# Patient Record
Sex: FEMALE | Race: WHITE | NOT HISPANIC OR LATINO | Employment: OTHER | URBAN - METROPOLITAN AREA
[De-identification: names, ages, dates, MRNs, and addresses within clinical notes are randomized per-mention and may not be internally consistent; named-entity substitution may affect disease eponyms.]

---

## 2024-08-04 ENCOUNTER — APPOINTMENT (OUTPATIENT)
Dept: CT IMAGING | Facility: HOSPITAL | Age: 64
End: 2024-08-04
Payer: MEDICARE

## 2024-08-04 ENCOUNTER — HOSPITAL ENCOUNTER (OUTPATIENT)
Facility: HOSPITAL | Age: 64
Setting detail: OBSERVATION
Discharge: HOME | End: 2024-08-05
Attending: EMERGENCY MEDICINE | Admitting: EMERGENCY MEDICINE
Payer: MEDICARE

## 2024-08-04 DIAGNOSIS — R55 NEAR SYNCOPE: Primary | ICD-10-CM

## 2024-08-04 DIAGNOSIS — E86.0 DEHYDRATION: ICD-10-CM

## 2024-08-04 DIAGNOSIS — D32.9 MENINGIOMA: ICD-10-CM

## 2024-08-04 DIAGNOSIS — G20.A1 PARKINSON'S DISEASE, UNSPECIFIED WHETHER DYSKINESIA PRESENT, UNSPECIFIED WHETHER MANIFESTATIONS FLUCTUATE: ICD-10-CM

## 2024-08-04 DIAGNOSIS — N20.1 CALCULUS OF URETER: ICD-10-CM

## 2024-08-04 LAB
ALBUMIN SERPL-MCNC: 4.5 G/DL (ref 3.5–5.2)
ALBUMIN/GLOB SERPL: 1.9 G/DL
ALP SERPL-CCNC: 83 U/L (ref 39–117)
ALT SERPL W P-5'-P-CCNC: <5 U/L (ref 1–33)
AMMONIA BLD-SCNC: 30 UMOL/L (ref 11–51)
ANION GAP SERPL CALCULATED.3IONS-SCNC: 10.1 MMOL/L (ref 5–15)
AST SERPL-CCNC: 18 U/L (ref 1–32)
BACTERIA UR QL AUTO: ABNORMAL /HPF
BASOPHILS # BLD AUTO: 0.02 10*3/MM3 (ref 0–0.2)
BASOPHILS NFR BLD AUTO: 0.3 % (ref 0–1.5)
BILIRUB SERPL-MCNC: 0.6 MG/DL (ref 0–1.2)
BILIRUB UR QL STRIP: NEGATIVE
BUN SERPL-MCNC: 20 MG/DL (ref 8–23)
BUN/CREAT SERPL: 29.9 (ref 7–25)
CALCIUM SPEC-SCNC: 9.5 MG/DL (ref 8.6–10.5)
CHLORIDE SERPL-SCNC: 106 MMOL/L (ref 98–107)
CLARITY UR: ABNORMAL
CO2 SERPL-SCNC: 23.9 MMOL/L (ref 22–29)
COLOR UR: YELLOW
CREAT SERPL-MCNC: 0.67 MG/DL (ref 0.57–1)
DEPRECATED RDW RBC AUTO: 44.2 FL (ref 37–54)
EGFRCR SERPLBLD CKD-EPI 2021: 97.1 ML/MIN/1.73
EOSINOPHIL # BLD AUTO: 0.08 10*3/MM3 (ref 0–0.4)
EOSINOPHIL NFR BLD AUTO: 1.2 % (ref 0.3–6.2)
ERYTHROCYTE [DISTWIDTH] IN BLOOD BY AUTOMATED COUNT: 13.1 % (ref 12.3–15.4)
GLOBULIN UR ELPH-MCNC: 2.4 GM/DL
GLUCOSE SERPL-MCNC: 118 MG/DL (ref 65–99)
GLUCOSE UR STRIP-MCNC: NEGATIVE MG/DL
HCT VFR BLD AUTO: 39.7 % (ref 34–46.6)
HGB BLD-MCNC: 12.5 G/DL (ref 12–15.9)
HGB UR QL STRIP.AUTO: NEGATIVE
HYALINE CASTS UR QL AUTO: ABNORMAL /LPF
IMM GRANULOCYTES # BLD AUTO: 0.02 10*3/MM3 (ref 0–0.05)
IMM GRANULOCYTES NFR BLD AUTO: 0.3 % (ref 0–0.5)
KETONES UR QL STRIP: ABNORMAL
LEUKOCYTE ESTERASE UR QL STRIP.AUTO: ABNORMAL
LIPASE SERPL-CCNC: 21 U/L (ref 13–60)
LYMPHOCYTES # BLD AUTO: 1.33 10*3/MM3 (ref 0.7–3.1)
LYMPHOCYTES NFR BLD AUTO: 19.2 % (ref 19.6–45.3)
MAGNESIUM SERPL-MCNC: 2.1 MG/DL (ref 1.6–2.4)
MCH RBC QN AUTO: 29 PG (ref 26.6–33)
MCHC RBC AUTO-ENTMCNC: 31.5 G/DL (ref 31.5–35.7)
MCV RBC AUTO: 92.1 FL (ref 79–97)
MONOCYTES # BLD AUTO: 0.48 10*3/MM3 (ref 0.1–0.9)
MONOCYTES NFR BLD AUTO: 6.9 % (ref 5–12)
NEUTROPHILS NFR BLD AUTO: 4.98 10*3/MM3 (ref 1.7–7)
NEUTROPHILS NFR BLD AUTO: 72.1 % (ref 42.7–76)
NITRITE UR QL STRIP: NEGATIVE
NRBC BLD AUTO-RTO: 0 /100 WBC (ref 0–0.2)
NT-PROBNP SERPL-MCNC: 51.8 PG/ML (ref 0–900)
PH UR STRIP.AUTO: 7 [PH] (ref 5–8)
PLATELET # BLD AUTO: 162 10*3/MM3 (ref 140–450)
PMV BLD AUTO: 11.7 FL (ref 6–12)
POTASSIUM SERPL-SCNC: 4 MMOL/L (ref 3.5–5.2)
PROT SERPL-MCNC: 6.9 G/DL (ref 6–8.5)
PROT UR QL STRIP: NEGATIVE
RBC # BLD AUTO: 4.31 10*6/MM3 (ref 3.77–5.28)
RBC # UR STRIP: ABNORMAL /HPF
REF LAB TEST METHOD: ABNORMAL
SODIUM SERPL-SCNC: 140 MMOL/L (ref 136–145)
SP GR UR STRIP: 1.02 (ref 1–1.03)
SQUAMOUS #/AREA URNS HPF: ABNORMAL /HPF
TROPONIN T SERPL HS-MCNC: 10 NG/L
TSH SERPL DL<=0.05 MIU/L-ACNC: 0.97 UIU/ML (ref 0.27–4.2)
UROBILINOGEN UR QL STRIP: ABNORMAL
WBC # UR STRIP: ABNORMAL /HPF
WBC NRBC COR # BLD AUTO: 6.91 10*3/MM3 (ref 3.4–10.8)

## 2024-08-04 PROCEDURE — 25010000002 ONDANSETRON PER 1 MG: Performed by: EMERGENCY MEDICINE

## 2024-08-04 PROCEDURE — 70450 CT HEAD/BRAIN W/O DYE: CPT

## 2024-08-04 PROCEDURE — 74176 CT ABD & PELVIS W/O CONTRAST: CPT

## 2024-08-04 PROCEDURE — 99285 EMERGENCY DEPT VISIT HI MDM: CPT

## 2024-08-04 PROCEDURE — 93005 ELECTROCARDIOGRAM TRACING: CPT | Performed by: EMERGENCY MEDICINE

## 2024-08-04 PROCEDURE — 80053 COMPREHEN METABOLIC PANEL: CPT | Performed by: EMERGENCY MEDICINE

## 2024-08-04 PROCEDURE — G0378 HOSPITAL OBSERVATION PER HR: HCPCS

## 2024-08-04 PROCEDURE — 81001 URINALYSIS AUTO W/SCOPE: CPT | Performed by: EMERGENCY MEDICINE

## 2024-08-04 PROCEDURE — 84484 ASSAY OF TROPONIN QUANT: CPT | Performed by: EMERGENCY MEDICINE

## 2024-08-04 PROCEDURE — 84443 ASSAY THYROID STIM HORMONE: CPT | Performed by: EMERGENCY MEDICINE

## 2024-08-04 PROCEDURE — 83880 ASSAY OF NATRIURETIC PEPTIDE: CPT | Performed by: NURSE PRACTITIONER

## 2024-08-04 PROCEDURE — 96374 THER/PROPH/DIAG INJ IV PUSH: CPT

## 2024-08-04 PROCEDURE — 85025 COMPLETE CBC W/AUTO DIFF WBC: CPT | Performed by: EMERGENCY MEDICINE

## 2024-08-04 PROCEDURE — 83690 ASSAY OF LIPASE: CPT | Performed by: EMERGENCY MEDICINE

## 2024-08-04 PROCEDURE — 82140 ASSAY OF AMMONIA: CPT | Performed by: EMERGENCY MEDICINE

## 2024-08-04 PROCEDURE — 83735 ASSAY OF MAGNESIUM: CPT | Performed by: EMERGENCY MEDICINE

## 2024-08-04 PROCEDURE — 93005 ELECTROCARDIOGRAM TRACING: CPT

## 2024-08-04 PROCEDURE — 25810000003 SODIUM CHLORIDE 0.9 % SOLUTION: Performed by: EMERGENCY MEDICINE

## 2024-08-04 RX ORDER — LEVODOPA AND CARBIDOPA 245; 61.25 MG/1; MG/1
1 CAPSULE, EXTENDED RELEASE ORAL 3 TIMES DAILY
COMMUNITY

## 2024-08-04 RX ORDER — SODIUM CHLORIDE 9 MG/ML
40 INJECTION, SOLUTION INTRAVENOUS AS NEEDED
Status: DISCONTINUED | OUTPATIENT
Start: 2024-08-04 | End: 2024-08-05 | Stop reason: HOSPADM

## 2024-08-04 RX ORDER — PRAMIPEXOLE DIHYDROCHLORIDE 1.5 MG/1
1.5 TABLET ORAL 3 TIMES DAILY
COMMUNITY

## 2024-08-04 RX ORDER — HYDROXYZINE PAMOATE 25 MG/1
25 CAPSULE ORAL 3 TIMES DAILY PRN
COMMUNITY
End: 2024-08-04

## 2024-08-04 RX ORDER — BISACODYL 10 MG
10 SUPPOSITORY, RECTAL RECTAL DAILY PRN
Status: DISCONTINUED | OUTPATIENT
Start: 2024-08-04 | End: 2024-08-05 | Stop reason: HOSPADM

## 2024-08-04 RX ORDER — SODIUM CHLORIDE 0.9 % (FLUSH) 0.9 %
10 SYRINGE (ML) INJECTION EVERY 12 HOURS SCHEDULED
Status: DISCONTINUED | OUTPATIENT
Start: 2024-08-04 | End: 2024-08-05 | Stop reason: HOSPADM

## 2024-08-04 RX ORDER — SELEGILINE HYDROCHLORIDE 5 MG/1
5 CAPSULE ORAL
Status: DISCONTINUED | OUTPATIENT
Start: 2024-08-04 | End: 2024-08-05 | Stop reason: HOSPADM

## 2024-08-04 RX ORDER — POLYETHYLENE GLYCOL 3350 17 G/17G
17 POWDER, FOR SOLUTION ORAL DAILY PRN
Status: DISCONTINUED | OUTPATIENT
Start: 2024-08-04 | End: 2024-08-05 | Stop reason: HOSPADM

## 2024-08-04 RX ORDER — CARBIDOPA AND LEVODOPA 25; 100 MG/1; MG/1
2 TABLET, EXTENDED RELEASE ORAL 3 TIMES DAILY
Status: DISCONTINUED | OUTPATIENT
Start: 2024-08-04 | End: 2024-08-05 | Stop reason: HOSPADM

## 2024-08-04 RX ORDER — NITROGLYCERIN 0.4 MG/1
0.4 TABLET SUBLINGUAL
Status: DISCONTINUED | OUTPATIENT
Start: 2024-08-04 | End: 2024-08-05 | Stop reason: HOSPADM

## 2024-08-04 RX ORDER — AMOXICILLIN 250 MG
2 CAPSULE ORAL 2 TIMES DAILY PRN
Status: DISCONTINUED | OUTPATIENT
Start: 2024-08-04 | End: 2024-08-05 | Stop reason: HOSPADM

## 2024-08-04 RX ORDER — ALUMINA, MAGNESIA, AND SIMETHICONE 2400; 2400; 240 MG/30ML; MG/30ML; MG/30ML
15 SUSPENSION ORAL EVERY 6 HOURS PRN
Status: DISCONTINUED | OUTPATIENT
Start: 2024-08-04 | End: 2024-08-05 | Stop reason: HOSPADM

## 2024-08-04 RX ORDER — ONDANSETRON 4 MG/1
4 TABLET, ORALLY DISINTEGRATING ORAL EVERY 6 HOURS PRN
Status: DISCONTINUED | OUTPATIENT
Start: 2024-08-04 | End: 2024-08-05 | Stop reason: HOSPADM

## 2024-08-04 RX ORDER — ONDANSETRON 2 MG/ML
4 INJECTION INTRAMUSCULAR; INTRAVENOUS EVERY 6 HOURS PRN
Status: DISCONTINUED | OUTPATIENT
Start: 2024-08-04 | End: 2024-08-05 | Stop reason: HOSPADM

## 2024-08-04 RX ORDER — BISACODYL 5 MG/1
5 TABLET, DELAYED RELEASE ORAL DAILY PRN
Status: DISCONTINUED | OUTPATIENT
Start: 2024-08-04 | End: 2024-08-05 | Stop reason: HOSPADM

## 2024-08-04 RX ORDER — SELEGILINE HYDROCHLORIDE 5 MG/1
5 CAPSULE ORAL
COMMUNITY

## 2024-08-04 RX ORDER — SODIUM CHLORIDE 0.9 % (FLUSH) 0.9 %
10 SYRINGE (ML) INJECTION AS NEEDED
Status: DISCONTINUED | OUTPATIENT
Start: 2024-08-04 | End: 2024-08-05 | Stop reason: HOSPADM

## 2024-08-04 RX ORDER — ONDANSETRON 2 MG/ML
8 INJECTION INTRAMUSCULAR; INTRAVENOUS ONCE
Status: COMPLETED | OUTPATIENT
Start: 2024-08-04 | End: 2024-08-04

## 2024-08-04 RX ADMIN — PRAMIPEXOLE DIHYDROCHLORIDE 1.5 MG: 1 TABLET ORAL at 22:54

## 2024-08-04 RX ADMIN — PRAMIPEXOLE DIHYDROCHLORIDE 1.5 MG: 1 TABLET ORAL at 18:10

## 2024-08-04 RX ADMIN — CARBIDOPA AND LEVODOPA 2 TABLET: 25; 100 TABLET, EXTENDED RELEASE ORAL at 22:53

## 2024-08-04 RX ADMIN — CARBIDOPA AND LEVODOPA 2 TABLET: 25; 100 TABLET, EXTENDED RELEASE ORAL at 18:10

## 2024-08-04 RX ADMIN — Medication 10 ML: at 20:53

## 2024-08-04 RX ADMIN — SODIUM CHLORIDE 500 ML: 9 INJECTION, SOLUTION INTRAVENOUS at 13:34

## 2024-08-04 RX ADMIN — ONDANSETRON 8 MG: 2 INJECTION INTRAMUSCULAR; INTRAVENOUS at 13:36

## 2024-08-04 RX ADMIN — SELEGILINE HYDROCHLORIDE 5 MG: 5 CAPSULE ORAL at 18:10

## 2024-08-04 NOTE — PLAN OF CARE
Goal Outcome Evaluation:    New admit. Patient denies n/v, dizziness. Awaiting cardiology consult for further recommendations

## 2024-08-04 NOTE — ED PROVIDER NOTES
Subjective   History of Present Illness  64-year-old female was at [employer] when she had the sudden onset of abdominal discomfort, diaphoresis, and severe nausea.  The patient went to the bathroom and vomited.  The patient states she felt like she was about to pass out.  She states that the abdominal pain then got better and the patient was feeling weak and still somewhat nauseated.  She states she has not recently had headaches or focal neurologic defects or lateralizing neurologic signs.  The patient denies neck pain or stiffness she denies chest pain or exertional dyspnea she reports no recent weight changes.  The family has been concerned about the possibility of Parkinson syndrome as the patient's worsening tremor and other symptomatology      Review of Systems   Neurological:  Positive for tremors, weakness and light-headedness. Negative for facial asymmetry, speech difficulty and numbness.   Hematological:  Does not bruise/bleed easily.       No past medical history on file.  The patient told the nurse that she had been evaluated and there was concern for Parkinson syndrome.  The family came out independently to tell me of their parkinsonian concerns.  The patient has history of previous kidney stones medication review found the patient to be taking carbidopa and levodopa  No Known Allergies    No past surgical history on file.    No family history on file.    Social History     Socioeconomic History    Marital status:        Lives independently has traveled recently to [place] however the family reports this is not an unusual amount of activity for her    Objective   Physical Exam  Alert [place] Coma Scale 15   HEENT: Pupils equal and reactive to light. Conjunctivae are not injected. Normal tympanic membranes. Oropharynx and nares are normal.   Neck: Supple. Midline trachea. No JVD. No goiter.   Chest: Clear and equal breath sounds bilaterally, regular rate and rhythm without murmur or rub.   Abdomen:  Positive bowel sounds, nontender, nondistended. No rebound or peritoneal signs. No CVA tenderness.   Extremities/neuro right-hand-dominant cranial nerves intact there is a parkinsonian facies and what appears to be an essential tremor with range of motion there does appear to be some mild cogwheeling no clubbing. cyanosis or edema. Motor sensory exam is normal. The full range of motion is intact   Skin: Warm and dry, no rashes or petechia.   Lymphatic: No regional lymphadenopathy. No calf pain, swelling or Homans sign    Procedures           ED Course                                 Labs Reviewed   COMPREHENSIVE METABOLIC PANEL - Abnormal; Notable for the following components:       Result Value    Glucose 118 (*)     BUN/Creatinine Ratio 29.9 (*)     All other components within normal limits    Narrative:     GFR Normal >60  Chronic Kidney Disease <60  Kidney Failure <15     URINALYSIS W/ CULTURE IF INDICATED - Abnormal; Notable for the following components:    Appearance, UA Cloudy (*)     Ketones, UA Trace (*)     Leuk Esterase, UA Moderate (2+) (*)     All other components within normal limits    Narrative:     In absence of clinical symptoms, the presence of pyuria, bacteria, and/or nitrites on the urinalysis result does not correlate with infection.   CBC WITH AUTO DIFFERENTIAL - Abnormal; Notable for the following components:    Lymphocyte % 19.2 (*)     All other components within normal limits   URINALYSIS, MICROSCOPIC ONLY - Abnormal; Notable for the following components:    WBC, UA 3-5 (*)     All other components within normal limits   LIPASE - Normal   AMMONIA - Normal   MAGNESIUM - Normal   TSH - Normal   SINGLE HS TROPONIN T   CBC AND DIFFERENTIAL    Narrative:     The following orders were created for panel order CBC & Differential.  Procedure                               Abnormality         Status                     ---------                               -----------         ------                      CBC Auto Differential[[id number]]        Abnormal            Final result                 Please view results for these tests on the individual orders.     Medications   ondansetron (ZOFRAN) injection 8 mg (8 mg Intravenous Given 8/4/24 1336)   sodium chloride 0.9 % bolus 500 mL (0 mL Intravenous Stopped 8/4/24 1429)     CT Abdomen Pelvis Without Contrast    Result Date: 8/4/2024  Impression: 1.4 mm calcification adjacent to right ureterovesical junction, but without definite right hydronephrosis. This could either represent a pelvic phlebolith versus stone within distal right ureter. 2.Otherwise, no acute process identified on this exam. 3.Multiple calcifications within uterus, likely partially calcified fibroids. Electronically Signed: Que White MD  8/4/2024 1:06 PM EDT  Workstation ID: [id number]    CT Head Without Contrast    Result Date: 8/4/2024  Impression: 1.No acute intracranial process identified. 2.2 cm calcified extra-axial mass along right frontal convexity, most suggestive of meningioma. Electronically Signed: Que White MD  8/4/2024 12:59 PM EDT  Workstation ID: [id number]               Medical Decision Making  Meningioma was noted but there is no evidence of mass effect or edema.  There is no hemorrhage.  The patient will need follow-up evaluation.  The patient also was noted to have a small stone at the ureteropelvic junction.  The patient will be observed the patient had medication for pain control the patient was agreeable to this plan of treatment we will obtain serial troponin    Amount and/or Complexity of Data Reviewed  Labs: ordered.  Radiology: ordered.  ECG/medicine tests: ordered.    Risk  Prescription drug management.        Final diagnoses:   None       ED Disposition  ED Disposition       None            No follow-up provider specified.       Medication List      No changes were made to your prescriptions during this visit.            Gaurav Romero MD  08/04/24 6322

## 2024-08-04 NOTE — ED NOTES
Pt reports being at [employer] when she suddenly started feeling nauseous, tachycardic, and diaphoretic. Pt reports she vomited once with pink colored emesis- pt and pts family at bedside denies thinking it was blood at all. Pt reports still feeling really nauseous. Pts family at bedside thought she may have had a cardiac event or may be dehydrated. Pts family at bedside also reports pt seems delayed in her responses like she may be confused. Pt A&Ox4.

## 2024-08-05 ENCOUNTER — APPOINTMENT (OUTPATIENT)
Dept: RESPIRATORY THERAPY | Facility: HOSPITAL | Age: 64
End: 2024-08-05
Payer: MEDICARE

## 2024-08-05 VITALS
SYSTOLIC BLOOD PRESSURE: 112 MMHG | OXYGEN SATURATION: 96 % | HEIGHT: 67 IN | DIASTOLIC BLOOD PRESSURE: 67 MMHG | TEMPERATURE: 97.4 F | RESPIRATION RATE: 16 BRPM | HEART RATE: 57 BPM | WEIGHT: 212.08 LBS | BODY MASS INDEX: 33.29 KG/M2

## 2024-08-05 LAB
ANION GAP SERPL CALCULATED.3IONS-SCNC: 9.5 MMOL/L (ref 5–15)
BASOPHILS # BLD AUTO: 0.02 10*3/MM3 (ref 0–0.2)
BASOPHILS NFR BLD AUTO: 0.3 % (ref 0–1.5)
BUN SERPL-MCNC: 19 MG/DL (ref 8–23)
BUN/CREAT SERPL: 25.3 (ref 7–25)
CALCIUM SPEC-SCNC: 9.1 MG/DL (ref 8.6–10.5)
CHLORIDE SERPL-SCNC: 104 MMOL/L (ref 98–107)
CO2 SERPL-SCNC: 24.5 MMOL/L (ref 22–29)
CREAT SERPL-MCNC: 0.75 MG/DL (ref 0.57–1)
D DIMER PPP FEU-MCNC: 0.44 MG/L (FEU) (ref 0–0.65)
DEPRECATED RDW RBC AUTO: 44 FL (ref 37–54)
EGFRCR SERPLBLD CKD-EPI 2021: 88.5 ML/MIN/1.73
EOSINOPHIL # BLD AUTO: 0.19 10*3/MM3 (ref 0–0.4)
EOSINOPHIL NFR BLD AUTO: 3 % (ref 0.3–6.2)
ERYTHROCYTE [DISTWIDTH] IN BLOOD BY AUTOMATED COUNT: 13 % (ref 12.3–15.4)
GLUCOSE SERPL-MCNC: 103 MG/DL (ref 65–99)
HCT VFR BLD AUTO: 38.3 % (ref 34–46.6)
HGB BLD-MCNC: 12.2 G/DL (ref 12–15.9)
IMM GRANULOCYTES # BLD AUTO: 0.01 10*3/MM3 (ref 0–0.05)
IMM GRANULOCYTES NFR BLD AUTO: 0.2 % (ref 0–0.5)
LYMPHOCYTES # BLD AUTO: 1.96 10*3/MM3 (ref 0.7–3.1)
LYMPHOCYTES NFR BLD AUTO: 30.5 % (ref 19.6–45.3)
MCH RBC QN AUTO: 29.2 PG (ref 26.6–33)
MCHC RBC AUTO-ENTMCNC: 31.9 G/DL (ref 31.5–35.7)
MCV RBC AUTO: 91.6 FL (ref 79–97)
MONOCYTES # BLD AUTO: 0.49 10*3/MM3 (ref 0.1–0.9)
MONOCYTES NFR BLD AUTO: 7.6 % (ref 5–12)
NEUTROPHILS NFR BLD AUTO: 3.75 10*3/MM3 (ref 1.7–7)
NEUTROPHILS NFR BLD AUTO: 58.4 % (ref 42.7–76)
NRBC BLD AUTO-RTO: 0 /100 WBC (ref 0–0.2)
PLATELET # BLD AUTO: 168 10*3/MM3 (ref 140–450)
PMV BLD AUTO: 11.7 FL (ref 6–12)
POTASSIUM SERPL-SCNC: 4 MMOL/L (ref 3.5–5.2)
QT INTERVAL: 436 MS
QTC INTERVAL: 436 MS
RBC # BLD AUTO: 4.18 10*6/MM3 (ref 3.77–5.28)
SODIUM SERPL-SCNC: 138 MMOL/L (ref 136–145)
TROPONIN T SERPL HS-MCNC: 10 NG/L
WBC NRBC COR # BLD AUTO: 6.42 10*3/MM3 (ref 3.4–10.8)

## 2024-08-05 PROCEDURE — 80048 BASIC METABOLIC PNL TOTAL CA: CPT | Performed by: NURSE PRACTITIONER

## 2024-08-05 PROCEDURE — G0378 HOSPITAL OBSERVATION PER HR: HCPCS

## 2024-08-05 PROCEDURE — 99204 OFFICE O/P NEW MOD 45 MIN: CPT | Performed by: INTERNAL MEDICINE

## 2024-08-05 PROCEDURE — 85379 FIBRIN DEGRADATION QUANT: CPT | Performed by: NURSE PRACTITIONER

## 2024-08-05 PROCEDURE — 84484 ASSAY OF TROPONIN QUANT: CPT | Performed by: NURSE PRACTITIONER

## 2024-08-05 PROCEDURE — 85025 COMPLETE CBC W/AUTO DIFF WBC: CPT | Performed by: NURSE PRACTITIONER

## 2024-08-05 PROCEDURE — 93242 EXT ECG>48HR<7D RECORDING: CPT

## 2024-08-05 RX ADMIN — Medication 10 ML: at 08:16

## 2024-08-05 RX ADMIN — PRAMIPEXOLE DIHYDROCHLORIDE 1.5 MG: 1 TABLET ORAL at 08:15

## 2024-08-05 RX ADMIN — SELEGILINE HYDROCHLORIDE 5 MG: 5 CAPSULE ORAL at 08:16

## 2024-08-05 RX ADMIN — CARBIDOPA AND LEVODOPA 2 TABLET: 25; 100 TABLET, EXTENDED RELEASE ORAL at 08:16

## 2024-08-05 NOTE — PLAN OF CARE
Problem: Adult Inpatient Plan of Care  Goal: Absence of Hospital-Acquired Illness or Injury  Intervention: Identify and Manage Fall Risk  Recent Flowsheet Documentation  Taken 8/5/2024 0200 by Mayra Bird, RN  Safety Promotion/Fall Prevention: safety round/check completed  Taken 8/5/2024 0000 by Mayra Bird, RN  Safety Promotion/Fall Prevention: safety round/check completed   Goal Outcome Evaluation:         Pt had no complaints through the night. Pt does not currently have any consults or any further orders at this time. Care continues.

## 2024-08-05 NOTE — CASE MANAGEMENT/SOCIAL WORK
Case Management Discharge Note      Final Note: home with family         Selected Continued Care - Discharged on 8/5/2024 Admission date: 8/4/2024 - Discharge disposition: Home or Self Care   Transportation Services  Private: Car    Final Discharge Disposition Code: 01 - home or self-care

## 2024-08-05 NOTE — CONSULTS
Cardiology Consult Note    Patient Identification:  Name: Stefanie Burt  Age: 64 y.o.  Sex: female  :  1960  MRN: [id number]             Requesting Physician :  CASSIDY Melvin ED Observation     Reason for Consultation / Chief Complaint :   Near Syncope     History of Present Illness:      Mrs. Stefanie Burt has a PMH of     - Parkinson's disease   - otherwise no significant medical history   - parents with hypertension, but no CAD   - non smoker   - no allergies     Presented to the ED on 2024 with significant nausea.  Per patient she reports that she had significant nausea, and possibly some like abdominal cramping but no significant abdominal pain no vomiting she states that she was not dizzy or lightheaded.  She was at [employer] when this occurred she was able to walk to her car afterwards.  She denies any chest pain or shortness of breath.  Per her daughter who is a physical therapist here at the hospital reports that she was mildly confused clammy and diaphoretic therefore she brought her to the hospital.   Note that patient had just returned from a trip to [place].     In the ED labs were unremarkable including high-sensitivity troponin EKG showed sinus rhythm with no significant T wave abnormalities.  Orthostatic vital signs were negative  Again patient herself denies any lightheadedness or dizziness to suggest near syncope  Will check D-dimer    CT head unremarkable except for meningioma CT abdomen and pelvis did show possible kidney stone      Discussed with patient and family at bedside as well as nurse practitioner  Further recommendations per Dr. Lin        Electronically signed by CASSIDY Rock, 24, 12:20 PM EDT.    Cardiology attending addendum :    I have personally performed a face-to-face diagnostic evaluation, physical exam and reviewed data on this patient.  I have reviewed documentation done by me and nurse practitioner  and corrected as needed.  And agree  with the different components of documentation.Greater than 50% of the time spent in the care of this patient was provided by attending consultant/me.          Assessment:  :    Dizziness  Near syncope  Abdominal cramping and nausea  Parkinson's  Hyperglycemia    Recommendations / Plan:        EKG done 8/4/2024 reviewed/interpreted by me reveals sinus rhythm with rate of 63 bpm  Patient presented with dizziness and near syncope while in charge.  Symptoms appear vasovagal in etiology.  Will check echo and Holter monitor to evaluate symptoms.  Patient's  and patient wants to go home.  Discussed with admitting nurse practitioner Marilyn, will get echo as outpatient and do event monitor and follow-up in cardiology clinic after to monitor.  Discussed with patient and her  advised him to come back if symptoms are worse.  Follow-up with PMD for noncardiac symptoms and labs.           Diagnosis Plan   1. Near syncope        2. Calculus of ureter        3. Meningioma        4. Dehydration        5. Parkinson's disease, unspecified whether dyskinesia present, unspecified whether manifestations fluctuate                   Past Medical History:  History reviewed. No pertinent past medical history.  Past Surgical History:  History reviewed. No pertinent surgical history.   Allergies:  No Known Allergies  Home Meds:  Medications Prior to Admission   Medication Sig Dispense Refill Last Dose    Carbidopa-Levodopa ER (Rytary) 61. MG capsule controlled-release Take 1 capsule by mouth 3 (Three) Times a Day.   8/4/2024 at 9    pramipexole (MIRAPEX) 1.5 MG tablet Take 1 tablet by mouth 3 (Three) Times a Day.   8/4/2024 at 9    selegiline (ELDEPRYL) 5 MG capsule Take 1 capsule by mouth 2 (Two) Times a Day Before Meals.   8/4/2024 at 9     Current Meds:     Current Facility-Administered Medications:     aluminum-magnesium hydroxide-simethicone (MAALOX MAX) 400-400-40 MG/5ML suspension 15 mL, 15 mL, Oral, Q6H PRN,  Marilyn Hatfield APRN    sennosides-docusate (PERICOLACE) 8.6-50 MG per tablet 2 tablet, 2 tablet, Oral, BID PRN **AND** polyethylene glycol (MIRALAX) packet 17 g, 17 g, Oral, Daily PRN **AND** bisacodyl (DULCOLAX) EC tablet 5 mg, 5 mg, Oral, Daily PRN **AND** bisacodyl (DULCOLAX) suppository 10 mg, 10 mg, Rectal, Daily PRN, TripYassine kurtza S, APRN    carbidopa-levodopa ER (SINEMET CR)  MG per tablet 2 tablet, 2 tablet, Oral, TID, Marilyn Hatfield S, APRN, 2 tablet at 08/05/24 0816    nitroglycerin (NITROSTAT) SL tablet 0.4 mg, 0.4 mg, Sublingual, Q5 Min PRN, Marilyn Hatfield APRN    ondansetron ODT (ZOFRAN-ODT) disintegrating tablet 4 mg, 4 mg, Oral, Q6H PRN **OR** ondansetron (ZOFRAN) injection 4 mg, 4 mg, Intravenous, Q6H PRN, Yassine Hatfielda S, APRN    pramipexole (MIRAPEX) tablet 1.5 mg, 1.5 mg, Oral, TID, TripYassine kurtza S, APRN, 1.5 mg at 08/05/24 0815    selegiline (ELDEPRYL) capsule 5 mg, 5 mg, Oral, BID AC, TripYassine kurtza S, APRN, 5 mg at 08/05/24 0816    sodium chloride 0.9 % flush 10 mL, 10 mL, Intravenous, Q12H, Tripjerardo, Marilyn S, APRN, 10 mL at 08/05/24 0816    sodium chloride 0.9 % flush 10 mL, 10 mL, Intravenous, PRN, TripYassine kurtza S, APRN    sodium chloride 0.9 % infusion 40 mL, 40 mL, Intravenous, PRN, Tripure, Marilyn S, APRN  Social History:   Social History     Tobacco Use    Smoking status: Never    Smokeless tobacco: Never   Substance Use Topics    Alcohol use: Yes     Alcohol/week: 1.0 standard drink of alcohol     Types: 1 Glasses of wine per week      Family History:  History reviewed. No pertinent family history.     Review of Systems : Review of Systems   Cardiovascular:  Negative for chest pain, dyspnea on exertion, irregular heartbeat, near-syncope, palpitations and syncope.   Respiratory:  Negative for cough and shortness of breath.    Musculoskeletal:  Positive for back pain.   Gastrointestinal:  Positive for nausea. Negative for vomiting.  "  Neurological:  Negative for dizziness and light-headedness.   All other systems reviewed and are negative.        Constitutional:  Temp:  [97.3 °F (36.3 °C)-97.8 °F (36.6 °C)] 97.5 °F (36.4 °C)  Heart Rate:  [59-79] 71  Resp:  [16-21] 20  BP: (111-158)/(56-87) 115/66    Physical Exam   /66 (BP Location: Left arm, Patient Position: Lying)   Pulse 71   Temp 97.5 °F (36.4 °C) (Oral)   Resp 20   Ht 170.2 cm (67\")   Wt 96.2 kg (212 lb 1.3 oz)   SpO2 97%   BMI 33.22 kg/m²   Physical Exam  General:  Appears in no acute distress  Eyes: Sclerae are anicteric,  conjunctivae are clear   HEENT:  No JVD. Thyroid not visibly enlarged. No mucosal pallor or cyanosis  Respiratory: Respirations regular and unlabored at rest.  Bilaterally good breath sounds with good air entry in all fields. No crackles, rubs or wheezes auscultated  Cardiovascular: S1,S2 Regular rate and rhythm. No murmur, rub or gallop auscultated. No pretibial pitting edema  Gastrointestinal: Abdomen soft, flat, nontender. Bowel sounds present.   Musculoskeletal:  No abnormal movements  Extremities: No digital clubbing or cyanosis  Skin: Color pink. Skin warm and dry to touch. No rashes  No xanthoma  Neuro: Alert and awake, no lateralizing deficits appreciated    Cardiographics  ECG: EKG tracing was  personally reviewed/interpreted by me  ECG 12 Lead Tachycardia   Final Result   HEART RATE=63  bpm   RR Muhcnvbu=7516  ms   WY Qjpziqkb=474  ms   P Horizontal Axis=11  deg   P Front Axis=53  deg   QRSD Interval=92  ms   QT Rkfydggs=128  ms   LWoR=895  ms   QRS Axis=40  deg   T Wave Axis=32  deg   - ABNORMAL ECG -   Sinus rhythm   Atrial premature complexes   Electronically Signed By: Gaurav Romero (Get) 2024-08-05 07:33:48   Date and Time of Study:2024-08-04 12:02:07      Telemetry Scan   Final Result      Telemetry Scan   Final Result      Telemetry Scan   Final Result      Telemetry Scan   Final Result      Telemetry Scan   Final Result    "       Telemetry: sinus rhythm     Echocardiogram:       Imaging  Chest X-ray:   Imaging Results (Last 24 Hours)       Procedure Component Value Units Date/Time    CT Abdomen Pelvis Without Contrast [[id number]] Collected: 08/04/24 1259     Updated: 08/04/24 1308    Narrative:      CT ABDOMEN PELVIS WO CONTRAST    Date of Exam: 8/4/2024 12:49 PM EDT    Indication: vomiting, nausea.    Comparison: None available.    Technique: Axial CT images were obtained of the abdomen and pelvis without the administration of contrast. Sagittal and coronal reconstructions were performed.  Automated exposure control and iterative reconstruction methods were used.      Findings:  The lung bases are clear.    The unenhanced liver, gallbladder, adrenal glands, kidneys, spleen, and pancreas are unremarkable.    The stomach appears normal. The small bowel appears normal in caliber and configuration. The colon appears normal. The appendix appears normal. There is no ascites or loculated collection. No abnormally enlarged lymph nodes are identified.    The rectum is unremarkable. There are several calcifications in the uterus, likely partially calcified fibroids. There is a 4 mm calcification adjacent to the right ureterovesical junction. There is a lipoma along the right anterior thigh musculature.    No aggressive osseous lesions are identified.      Impression:      Impression:  1.4 mm calcification adjacent to right ureterovesical junction, but without definite right hydronephrosis. This could either represent a pelvic phlebolith versus stone within distal right ureter.  2.Otherwise, no acute process identified on this exam.  3.Multiple calcifications within uterus, likely partially calcified fibroids.            Electronically Signed: Que White MD    8/4/2024 1:06 PM EDT    Workstation ID: [id number]    CT Head Without Contrast [[id number]] Collected: 08/04/24 1257     Updated: 08/04/24 1301    Narrative:      CT HEAD WO  CONTRAST    Date of Exam: 8/4/2024 12:49 PM EDT    Indication: weakness,  confusion.    Comparison: None available.    Technique: Axial CT images were obtained of the head without contrast administration.  Coronal reconstructions were performed.  Automated exposure control and iterative reconstruction methods were used.      Findings:  No acute intracranial hemorrhage or extra-axial collection is identified. The ventricles appear normal in caliber, with no evidence of mass effect or midline shift. The basal cisterns appear patent. The gray-white differentiation appears preserved.    The calvarium appear intact. The paranasal sinuses are clear. The mastoid air cells are well-aerated. There is a 2 cm calcified extra-axial mass along the right frontal convexity, most compatible with meningioma.      Impression:      Impression:  1.No acute intracranial process identified.  2.2 cm calcified extra-axial mass along right frontal convexity, most suggestive of meningioma.      Electronically Signed: Que White MD    8/4/2024 12:59 PM EDT    Workstation ID: [id number]            Lab Review: I have reviewed the labs  Results from last 7 days   Lab Units 08/05/24  0540 08/04/24  1322   HSTROP T ng/L 10 10     Results from last 7 days   Lab Units 08/04/24  1322   MAGNESIUM mg/dL 2.1     Results from last 7 days   Lab Units 08/05/24  0540   SODIUM mmol/L 138   POTASSIUM mmol/L 4.0   BUN mg/dL 19   CREATININE mg/dL 0.75   CALCIUM mg/dL 9.1         Results from last 7 days   Lab Units 08/04/24  1322   PROBNP pg/mL 51.8     Results from last 7 days   Lab Units 08/05/24  0540 08/04/24  1322   WBC 10*3/mm3 6.42 6.91   HEMOGLOBIN g/dL 12.2 12.5   HEMATOCRIT % 38.3 39.7   PLATELETS 10*3/mm3 168 162                 Mu Lin MD  8/5/2024, 08:51 EDT      EMR Dragon/Transcription:   Dictated utilizing Dragon dictation

## 2024-08-05 NOTE — DISCHARGE SUMMARY
[facility]    Meri Toro DO    CHIEF COMPLAINT:     Nausea and vomiting     HISTORY OF PRESENT ILLNESS:    [facility]  ED 08/04/2024  64-year-old female was at [facility] when she had the sudden onset of abdominal discomfort, diaphoresis, and severe nausea. The patient went to the bathroom and vomited. The patient states she felt like she was about to pass out. She states that the abdominal pain then got better and the patient was feeling weak and still somewhat nauseated. She states she has not recently had headaches or focal neurologic defects or lateralizing neurologic signs. The patient denies neck pain or stiffness she denies chest pain or exertional dyspnea she reports no recent weight changes. The family has been concerned about the possibility of Parkinson syndrome as the patient's worsening tremor and other symptomatology     Observation 08/05/2024  *Chart reviewed by this provider on 8/5 and based on ED report and nursing notes, cardiology was consulted for near syncope evaluation    Patient seen this morning and actually denies near syncope or tachycardia. She states she was at [facility] around 0930 in the morning and suddenly felt nausea, vomiting and diaphoresis. She had eaten the night before but was fasting during incident. She feels well and is hoping to be discharged home later today. Cardiology eval pending.     Dr Lin discussed with this provider his recommendations to have echo done outpatient and f/u with him in a month. Patient cleared by cardiology for discharge as her symptoms sound vasovagal.     History reviewed. No pertinent past medical history.  History reviewed. No pertinent surgical history.  History reviewed. No pertinent family history.  Social History     Tobacco Use    Smoking status: Never    Smokeless tobacco: Never   Vaping Use    Vaping status: Never Used   Substance Use Topics    Alcohol use: Yes     Alcohol/week: 1.0 standard drink of alcohol      Types: 1 Glasses of wine per week    Drug use: Defer     Medications Prior to Admission   Medication Sig Dispense Refill Last Dose    Carbidopa-Levodopa ER (Rytary) 61. MG capsule controlled-release Take 1 capsule by mouth 3 (Three) Times a Day.   8/4/2024 at 9    pramipexole (MIRAPEX) 1.5 MG tablet Take 1 tablet by mouth 3 (Three) Times a Day.   8/4/2024 at 9    selegiline (ELDEPRYL) 5 MG capsule Take 1 capsule by mouth 2 (Two) Times a Day Before Meals.   8/4/2024 at 9     Allergies:  Patient has no known allergies.      There is no immunization history on file for this patient.        REVIEW OF SYSTEMS:    Review of Systems   Constitutional: Positive for diaphoresis.   Cardiovascular:  Negative for chest pain.   Gastrointestinal:  Positive for nausea and vomiting. Negative for abdominal pain.   Neurological:  Positive for tremors. Negative for dizziness and light-headedness.        Per chart review hx of Parkinson's   All other systems reviewed and are negative.        Vital Signs  Temp:  [97.3 °F (36.3 °C)-97.8 °F (36.6 °C)] 97.4 °F (36.3 °C)  Heart Rate:  [57-79] 57  Resp:  [16-21] 16  BP: (111-158)/(56-87) 112/67          Physical Exam:  Physical Exam  Vitals and nursing note reviewed.   Constitutional:       Appearance: Normal appearance. She is obese.   HENT:      Head: Normocephalic and atraumatic.      Right Ear: External ear normal.      Left Ear: External ear normal.      Nose: Nose normal.      Mouth/Throat:      Mouth: Mucous membranes are moist.      Pharynx: Oropharynx is clear.   Eyes:      Extraocular Movements: Extraocular movements intact.   Cardiovascular:      Rate and Rhythm: Normal rate and regular rhythm.      Pulses: Normal pulses.      Heart sounds: Normal heart sounds.   Pulmonary:      Effort: Pulmonary effort is normal.      Breath sounds: Normal breath sounds.   Abdominal:      General: Abdomen is flat. Bowel sounds are normal.      Palpations: Abdomen is soft.   Musculoskeletal:   "       General: Normal range of motion.      Cervical back: Normal range of motion.   Skin:     General: Skin is warm.   Neurological:      General: No focal deficit present.      Mental Status: She is alert and oriented to person, place, and time.   Psychiatric:         Mood and Affect: Mood normal.         Behavior: Behavior normal.           Emotional Behavior:    Normal   Debilities:   None  Results Review:    I reviewed the patient's new clinical results.  Lab Results (most recent)       Procedure Component Value Units Date/Time    D-dimer, Quantitative [[id number]]  (Normal) Collected: 08/05/24 1138    Specimen: Blood Updated: 08/05/24 1159     D-Dimer, Quantitative 0.44 mg/L (FEU)     Narrative:      According to the assay 's published package insert, a normal (<0.50 mg/L (FEU)) D-dimer result in conjunction with a non-high clinical probability assessment, excludes deep vein thrombosis (DVT) and pulmonary embolism (PE) with high sensitivity.    D-dimer values increase with age and this can make VTE exclusion of an older population difficult. To address this, the [employer], based on best available evidence and recent guidelines, recommends that clinicians use age-adjusted D-dimer thresholds in patients greater than 50 years of age with: a) a low probability of PE who do not meet all Pulmonary Embolism Rule Out Criteria, or b) in those with intermediate probability of PE.   The formula for an age-adjusted D-dimer cut-off is \"age/100\".  For example, a 60 year old patient would have an age-adjusted cut-off of 0.60 mg/L (FEU) and an 80 year old 0.80 mg/L (FEU).    Basic Metabolic Panel [[id number]]  (Abnormal) Collected: 08/05/24 0540    Specimen: Blood Updated: 08/05/24 0625     Glucose 103 mg/dL      BUN 19 mg/dL      Creatinine 0.75 mg/dL      Sodium 138 mmol/L      Potassium 4.0 mmol/L      Chloride 104 mmol/L      CO2 24.5 mmol/L      Calcium 9.1 mg/dL      BUN/Creatinine Ratio " 25.3     Anion Gap 9.5 mmol/L      eGFR 88.5 mL/min/1.73     Narrative:      GFR Normal >60  Chronic Kidney Disease <60  Kidney Failure <15      High Sensitivity Troponin T [[id number]]  (Normal) Collected: 08/05/24 0540    Specimen: Blood Updated: 08/05/24 0625     HS Troponin T 10 ng/L     Narrative:      High Sensitive Troponin T Reference Range:  <14.0 ng/L- Negative Female for AMI  <22.0 ng/L- Negative Male for AMI  >=14 - Abnormal Female indicating possible myocardial injury.  >=22 - Abnormal Male indicating possible myocardial injury.   Clinicians would have to utilize clinical acumen, EKG, Troponin, and serial changes to determine if it is an Acute Myocardial Infarction or myocardial injury due to an underlying chronic condition.         CBC & Differential [[id number]]  (Normal) Collected: 08/05/24 0540    Specimen: Blood Updated: 08/05/24 0604    Narrative:      The following orders were created for panel order CBC & Differential.  Procedure                               Abnormality         Status                     ---------                               -----------         ------                     CBC Auto Differential[[id number]]        Normal              Final result                 Please view results for these tests on the individual orders.    CBC Auto Differential [[id number]]  (Normal) Collected: 08/05/24 0540    Specimen: Blood Updated: 08/05/24 0604     WBC 6.42 10*3/mm3      RBC 4.18 10*6/mm3      Hemoglobin 12.2 g/dL      Hematocrit 38.3 %      MCV 91.6 fL      MCH 29.2 pg      MCHC 31.9 g/dL      RDW 13.0 %      RDW-SD 44.0 fl      MPV 11.7 fL      Platelets 168 10*3/mm3      Neutrophil % 58.4 %      Lymphocyte % 30.5 %      Monocyte % 7.6 %      Eosinophil % 3.0 %      Basophil % 0.3 %      Immature Grans % 0.2 %      Neutrophils, Absolute 3.75 10*3/mm3      Lymphocytes, Absolute 1.96 10*3/mm3      Monocytes, Absolute 0.49 10*3/mm3      Eosinophils, Absolute 0.19 10*3/mm3      Basophils,  Absolute 0.02 10*3/mm3      Immature Grans, Absolute 0.01 10*3/mm3      nRBC 0.0 /100 WBC     BNP [[id number]]  (Normal) Collected: 08/04/24 1322    Specimen: Blood Updated: 08/04/24 1609     proBNP 51.8 pg/mL     Narrative:      This assay is used as an aid in the diagnosis of individuals suspected of having heart failure. It can be used as an aid in the diagnosis of acute decompensated heart failure (ADHF) in patients presenting with signs and symptoms of ADHF to the emergency department (ED). In addition, NT-proBNP of <300 pg/mL indicates ADHF is not likely.    Age Range Result Interpretation  NT-proBNP Concentration (pg/mL:      <50             Positive            >450                   Gray                 300-450                    Negative             <300    50-75           Positive            >900                  Gray                300-900                  Negative            <300      >75             Positive            >1800                  Gray                300-1800                  Negative            <300    Single High Sensitivity Troponin T [[id number]]  (Normal) Collected: 08/04/24 1322    Specimen: Blood Updated: 08/04/24 1525     HS Troponin T 10 ng/L     Narrative:      High Sensitive Troponin T Reference Range:  <14.0 ng/L- Negative Female for AMI  <22.0 ng/L- Negative Male for AMI  >=14 - Abnormal Female indicating possible myocardial injury.  >=22 - Abnormal Male indicating possible myocardial injury.   Clinicians would have to utilize clinical acumen, EKG, Troponin, and serial changes to determine if it is an Acute Myocardial Infarction or myocardial injury due to an underlying chronic condition.         Comprehensive Metabolic Panel [[id number]]  (Abnormal) Collected: 08/04/24 1322    Specimen: Blood Updated: 08/04/24 1407     Glucose 118 mg/dL      BUN 20 mg/dL      Creatinine 0.67 mg/dL      Sodium 140 mmol/L      Potassium 4.0 mmol/L      Comment: Slight hemolysis detected by analyzer.  Result may be falsely elevated.        Chloride 106 mmol/L      CO2 23.9 mmol/L      Calcium 9.5 mg/dL      Total Protein 6.9 g/dL      Albumin 4.5 g/dL      ALT (SGPT) <5 U/L      AST (SGOT) 18 U/L      Alkaline Phosphatase 83 U/L      Total Bilirubin 0.6 mg/dL      Globulin 2.4 gm/dL      A/G Ratio 1.9 g/dL      BUN/Creatinine Ratio 29.9     Anion Gap 10.1 mmol/L      eGFR 97.1 mL/min/1.73     Narrative:      GFR Normal >60  Chronic Kidney Disease <60  Kidney Failure <15      Magnesium [[id number]]  (Normal) Collected: 08/04/24 1322    Specimen: Blood Updated: 08/04/24 1407     Magnesium 2.1 mg/dL     Lipase [[id number]]  (Normal) Collected: 08/04/24 1322    Specimen: Blood Updated: 08/04/24 1403     Lipase 21 U/L     TSH [[id number]]  (Normal) Collected: 08/04/24 1322    Specimen: Blood Updated: 08/04/24 1403     TSH 0.968 uIU/mL     Urinalysis With Culture If Indicated - Urine, Clean Catch [[id number]]  (Abnormal) Collected: 08/04/24 1338    Specimen: Urine, Clean Catch Updated: 08/04/24 1351     Color, UA Yellow     Appearance, UA Cloudy     pH, UA 7.0     Specific Gravity, UA 1.023     Glucose, UA Negative     Ketones, UA Trace     Bilirubin, UA Negative     Blood, UA Negative     Protein, UA Negative     Leuk Esterase, UA Moderate (2+)     Nitrite, UA Negative     Urobilinogen, UA 1.0 E.U./dL    Narrative:      In absence of clinical symptoms, the presence of pyuria, bacteria, and/or nitrites on the urinalysis result does not correlate with infection.    Urinalysis, Microscopic Only - Urine, Clean Catch [[id number]]  (Abnormal) Collected: 08/04/24 1338    Specimen: Urine, Clean Catch Updated: 08/04/24 1351     RBC, UA 0-2 /HPF      WBC, UA 3-5 /HPF      Comment: Urine culture not indicated.        Bacteria, UA None Seen /HPF      Squamous Epithelial Cells, UA 0-2 /HPF      Hyaline Casts, UA 0-2 /LPF      Methodology Automated Microscopy    Ammonia [[id number]]  (Normal) Collected: 08/04/24 1322    Specimen: Blood  Updated: 08/04/24 1346     Ammonia 30 umol/L     CBC & Differential [[id number]]  (Abnormal) Collected: 08/04/24 1322    Specimen: Blood Updated: 08/04/24 1328    Narrative:      The following orders were created for panel order CBC & Differential.  Procedure                               Abnormality         Status                     ---------                               -----------         ------                     CBC Auto Differential[[id number]]        Abnormal            Final result                 Please view results for these tests on the individual orders.    CBC Auto Differential [[id number]]  (Abnormal) Collected: 08/04/24 1322    Specimen: Blood Updated: 08/04/24 1328     WBC 6.91 10*3/mm3      RBC 4.31 10*6/mm3      Hemoglobin 12.5 g/dL      Hematocrit 39.7 %      MCV 92.1 fL      MCH 29.0 pg      MCHC 31.5 g/dL      RDW 13.1 %      RDW-SD 44.2 fl      MPV 11.7 fL      Platelets 162 10*3/mm3      Neutrophil % 72.1 %      Lymphocyte % 19.2 %      Monocyte % 6.9 %      Eosinophil % 1.2 %      Basophil % 0.3 %      Immature Grans % 0.3 %      Neutrophils, Absolute 4.98 10*3/mm3      Lymphocytes, Absolute 1.33 10*3/mm3      Monocytes, Absolute 0.48 10*3/mm3      Eosinophils, Absolute 0.08 10*3/mm3      Basophils, Absolute 0.02 10*3/mm3      Immature Grans, Absolute 0.02 10*3/mm3      nRBC 0.0 /100 WBC             Imaging Results (Most Recent)       Procedure Component Value Units Date/Time    CT Abdomen Pelvis Without Contrast [[id number]] Collected: 08/04/24 1259     Updated: 08/04/24 1308    Narrative:      CT ABDOMEN PELVIS WO CONTRAST    Date of Exam: 8/4/2024 12:49 PM EDT    Indication: vomiting, nausea.    Comparison: None available.    Technique: Axial CT images were obtained of the abdomen and pelvis without the administration of contrast. Sagittal and coronal reconstructions were performed.  Automated exposure control and iterative reconstruction methods were used.      Findings:  The lung bases  are clear.    The unenhanced liver, gallbladder, adrenal glands, kidneys, spleen, and pancreas are unremarkable.    The stomach appears normal. The small bowel appears normal in caliber and configuration. The colon appears normal. The appendix appears normal. There is no ascites or loculated collection. No abnormally enlarged lymph nodes are identified.    The rectum is unremarkable. There are several calcifications in the uterus, likely partially calcified fibroids. There is a 4 mm calcification adjacent to the right ureterovesical junction. There is a lipoma along the right anterior thigh musculature.    No aggressive osseous lesions are identified.      Impression:      Impression:  1.4 mm calcification adjacent to right ureterovesical junction, but without definite right hydronephrosis. This could either represent a pelvic phlebolith versus stone within distal right ureter.  2.Otherwise, no acute process identified on this exam.  3.Multiple calcifications within uterus, likely partially calcified fibroids.            Electronically Signed: Que White MD    8/4/2024 1:06 PM EDT    Workstation ID: [id number]    CT Head Without Contrast [[id number]] Collected: 08/04/24 1257     Updated: 08/04/24 1301    Narrative:      CT HEAD WO CONTRAST    Date of Exam: 8/4/2024 12:49 PM EDT    Indication: weakness,  confusion.    Comparison: None available.    Technique: Axial CT images were obtained of the head without contrast administration.  Coronal reconstructions were performed.  Automated exposure control and iterative reconstruction methods were used.      Findings:  No acute intracranial hemorrhage or extra-axial collection is identified. The ventricles appear normal in caliber, with no evidence of mass effect or midline shift. The basal cisterns appear patent. The gray-white differentiation appears preserved.    The calvarium appear intact. The paranasal sinuses are clear. The mastoid air cells are well-aerated.  There is a 2 cm calcified extra-axial mass along the right frontal convexity, most compatible with meningioma.      Impression:      Impression:  1.No acute intracranial process identified.  2.2 cm calcified extra-axial mass along right frontal convexity, most suggestive of meningioma.      Electronically Signed: Que White MD    8/4/2024 12:59 PM EDT    Workstation ID: [id number]          reviewed    ECG/EMG Results (most recent)       Procedure Component Value Units Date/Time    Telemetry Scan [[id number]] Resulted: 08/04/24     Updated: 08/05/24 0138    Telemetry Scan [[id number]] Resulted: 08/04/24     Updated: 08/05/24 0232    Telemetry Scan [[id number]] Resulted: 08/04/24     Updated: 08/05/24 0258    Telemetry Scan [[id number]] Resulted: 08/04/24     Updated: 08/05/24 0518    ECG 12 Lead Tachycardia [[id number]] Collected: 08/04/24 1202     Updated: 08/05/24 0734     QT Interval 436 ms      QTC Interval 436 ms     Narrative:      HEART RATE=63  bpm  RR Jptrpddo=5875  ms  ME Ayaxdhvq=663  ms  P Horizontal Axis=11  deg  P Front Axis=53  deg  QRSD Interval=92  ms  QT Hsqgubxd=452  ms  GJdQ=298  ms  QRS Axis=40  deg  T Wave Axis=32  deg  - ABNORMAL ECG -  Sinus rhythm  Atrial premature complexes  Electronically Signed By: Gaurav Romero ([facility]) 2024-08-05 07:33:48  Date and Time of Study:2024-08-04 12:02:07    Telemetry Scan [[id number]] Resulted: 08/04/24     Updated: 08/05/24 0812          reviewed            Microbiology Results (last 10 days)       ** No results found for the last 240 hours. **            Assessment & Plan     Near syncope     Near Syncope   Lab Results   Component Value Date    TROPONINT 10 08/05/2024    TROPONINT 10 08/04/2024   -Patient denies light-headiness, dizziness or near syncope yesterday but instead complains of nausea, vomiting and diaphoresis.  Likely vasovagal  -Troponin proBNP unremarkable  -CMP and CBC generally unremarkable  -D-dimer negative  -Ammonia 30, lipase 21 TSH  0.964  -UA negative for hematuria or UTI  -CT abdomen and pelvis showed a 4 mm calcification but without hydronephrosis or obstruction  -CT head showed no acute intracranial process and 2 cm meningioma  -EKG showed sinus rhythm with PACs, heart rate 63, KY interval 164 and   -Telemetry  -Holter at discharge  -Cardiology was consulted and recommended outpatient 2d echo and f/u with cardiology in 1 month.     Parkinson's  -Per chart review patient has been treated since 2011  -Continue carbidopa-levodopa, Mirapex and selegiline    Obesity  -BMI 33.22  -Lifestyle modifications    I discussed the patients findings and my recommendations with patient and nursing staff.     Discharge Diagnosis:      Near syncope      Hospital Course  Patient is a 64 y.o. female presented with nausea, vomiting and diaphoresis as noted in HPI above.  Troponin, proBNP, CMP, CBC, D-dimer, ammonia, lipase, TSH and UA unremarkable.  CT abdomen and pelvis showed 4 mm calcification without hydronephrosis or obstruction.  CT head showed no acute intracranial process.  EKG showed sinus rhythm with PACs.  Cardiology consulted for near syncope evaluation.  Patient was monitored overnight in the observation unit on telemetry.  The following day patient clarified and denied that she felt lightheaded or had a near syncopal episode yesterday. Cardiology evaluated patient and felt like her symptoms were vasovagal. Cardiology recommended outpatient 2d echo and f/u in 1 month. . At this time, patient felt to be in good condition for discharge with close follow up with PCP and cardiology. Instructed to take all medications as prescribed and to return to ED if any concerning signs/symptoms. All test/lab results were discussed with patient. All questions were answered and patient verbalizes understanding.         Past Medical History:   History reviewed. No pertinent past medical history.    Past Surgical History:   History reviewed. No pertinent  surgical history.    Social History:   Social History     Socioeconomic History    Marital status:    Tobacco Use    Smoking status: Never    Smokeless tobacco: Never   Vaping Use    Vaping status: Never Used   Substance and Sexual Activity    Alcohol use: Yes     Alcohol/week: 1.0 standard drink of alcohol     Types: 1 Glasses of wine per week    Drug use: Defer    Sexual activity: Defer       Procedures Performed         Consults:   Consults       Date and Time Order Name Status Description    8/4/2024  4:58 PM Inpatient Cardiology Consult Completed             Condition on Discharge:     Stable    Discharge Disposition  Home or Self Care    Discharge Medications     Discharge Medications        Continue These Medications        Instructions Start Date   pramipexole 1.5 MG tablet  Commonly known as: MIRAPEX   1.5 mg, Oral, 3 Times Daily      Rytary 61. MG capsule controlled-release  Generic drug: Carbidopa-Levodopa ER   1 capsule, Oral, 3 Times Daily      selegiline 5 MG capsule  Commonly known as: ELDEPRYL   5 mg, Oral, 2 Times Daily Before Meals               Discharge Diet:   Diet Instructions       Diet: Cardiac Diets; Healthy Heart (2-3 Na+); Regular (IDDSI 7); Thin (IDDSI 0)      Discharge Diet: Cardiac Diets    Cardiac Diet: Healthy Heart (2-3 Na+)    Texture: Regular (IDDSI 7)    Fluid Consistency: Thin (IDDSI 0)            Activity at Discharge:     Follow-up Appointments  No future appointments.  Additional Instructions for the Follow-ups that You Need to Schedule       Discharge Follow-up with PCP   As directed       Currently Documented PCP:    Meri Toro DO    PCP Phone Number:    [phone]     Follow Up Details: 5-7 days        Discharge Follow-up with Specified Provider: Cardiology; 1 Month   As directed      To: Cardiology   Follow Up: 1 Month                Test Results Pending at Discharge       Risk for Readmission (LACE) Score: 1 (8/5/2024  6:00 AM)      Greater than  30 minutes spent in discharge activities for this patient    Signature:Electronically signed by CASSIDY Pastrana, 08/05/24, 12:12 PM EDT.

## 2024-08-05 NOTE — PLAN OF CARE
Goal Outcome Evaluation:  Plan of Care Reviewed With: patient        Progress: no change  Outcome Evaluation: Pt plan to be seen by cardiology for clearance and hopeful to be discharged today.

## 2024-08-15 ENCOUNTER — TELEPHONE (OUTPATIENT)
Dept: CARDIOLOGY | Facility: CLINIC | Age: 64
End: 2024-08-15
Payer: MEDICARE

## 2024-08-15 NOTE — TELEPHONE ENCOUNTER
----- Message from Jenifer Rogel sent at 8/14/2024  4:22 PM EDT -----  Please let patient know that her Holter monitor was unremarkable if she needs to follow-up please schedule a visit she was supposed to see us in 1 month

## 2024-08-15 NOTE — TELEPHONE ENCOUNTER
Left a detailed message on Stefanie's phone about the results of the holter monitor and requested she call our office to schedule her hospital follow up appointment.